# Patient Record
Sex: FEMALE | Race: WHITE | NOT HISPANIC OR LATINO | URBAN - METROPOLITAN AREA
[De-identification: names, ages, dates, MRNs, and addresses within clinical notes are randomized per-mention and may not be internally consistent; named-entity substitution may affect disease eponyms.]

---

## 2017-04-04 ENCOUNTER — FOLLOW UP ESTABLISHED (OUTPATIENT)
Dept: URBAN - METROPOLITAN AREA CLINIC 10 | Facility: CLINIC | Age: 52
End: 2017-04-04
Payer: COMMERCIAL

## 2017-04-04 DIAGNOSIS — M25.18 FISTULA, OTHER SPECIFIED SITE: ICD-10-CM

## 2017-04-04 PROCEDURE — 92083 EXTENDED VISUAL FIELD XM: CPT | Performed by: OPHTHALMOLOGY

## 2017-04-04 PROCEDURE — 92012 INTRM OPH EXAM EST PATIENT: CPT | Performed by: OPHTHALMOLOGY

## 2017-04-04 PROCEDURE — 92133 CPTRZD OPH DX IMG PST SGM ON: CPT | Performed by: OPHTHALMOLOGY

## 2017-04-04 ASSESSMENT — INTRAOCULAR PRESSURE
OD: 12
OS: 12

## 2017-11-17 ENCOUNTER — FOLLOW UP ESTABLISHED (OUTPATIENT)
Dept: URBAN - METROPOLITAN AREA CLINIC 10 | Facility: CLINIC | Age: 52
End: 2017-11-17
Payer: COMMERCIAL

## 2017-11-17 PROCEDURE — 92012 INTRM OPH EXAM EST PATIENT: CPT | Performed by: OPHTHALMOLOGY

## 2017-11-17 ASSESSMENT — INTRAOCULAR PRESSURE
OS: 13
OD: 13

## 2019-09-03 ENCOUNTER — FOLLOW UP ESTABLISHED (OUTPATIENT)
Dept: URBAN - METROPOLITAN AREA CLINIC 30 | Facility: CLINIC | Age: 54
End: 2019-09-03
Payer: COMMERCIAL

## 2019-09-03 DIAGNOSIS — H53.123 TRANSIENT VISUAL LOSS, BILATERAL: ICD-10-CM

## 2019-09-03 DIAGNOSIS — H40.013 OPEN ANGLE WITH BORDERLINE FINDINGS, LOW RISK, BILATERAL: Primary | ICD-10-CM

## 2019-09-03 DIAGNOSIS — H43.392 OTHER VITREOUS OPACITIES, LEFT EYE: ICD-10-CM

## 2019-09-03 PROCEDURE — 92134 CPTRZ OPH DX IMG PST SGM RTA: CPT | Performed by: OPTOMETRIST

## 2019-09-03 PROCEDURE — 92002 INTRM OPH EXAM NEW PATIENT: CPT | Performed by: OPTOMETRIST

## 2019-09-03 PROCEDURE — 92014 COMPRE OPH EXAM EST PT 1/>: CPT | Performed by: OPTOMETRIST

## 2019-09-03 ASSESSMENT — KERATOMETRY
OD: 40.16
OS: 40.96

## 2019-09-03 ASSESSMENT — INTRAOCULAR PRESSURE
OD: 14
OS: 14

## 2019-09-03 ASSESSMENT — VISUAL ACUITY
OS: 20/20
OD: 20/20

## 2023-01-25 ENCOUNTER — OFFICE VISIT (OUTPATIENT)
Dept: URBAN - METROPOLITAN AREA CLINIC 30 | Facility: CLINIC | Age: 58
End: 2023-01-25
Payer: COMMERCIAL

## 2023-01-25 DIAGNOSIS — H43.392 OTHER VITREOUS OPACITIES, LEFT EYE: ICD-10-CM

## 2023-01-25 DIAGNOSIS — H35.371 PUCKERING OF MACULA, RIGHT EYE: ICD-10-CM

## 2023-01-25 DIAGNOSIS — M25.18 FISTULA, OTHER SPECIFIED SITE: ICD-10-CM

## 2023-01-25 DIAGNOSIS — Z98.890 OTHER SPECIFIED POSTPROCEDURAL STATES: ICD-10-CM

## 2023-01-25 DIAGNOSIS — H02.421 MYOGENIC PTOSIS OF RT EYELID: ICD-10-CM

## 2023-01-25 DIAGNOSIS — H40.013 OPEN ANGLE WITH BORDERLINE FINDINGS, LOW RISK, BILATERAL: Primary | ICD-10-CM

## 2023-01-25 PROCEDURE — 92134 CPTRZ OPH DX IMG PST SGM RTA: CPT | Performed by: OPTOMETRIST

## 2023-01-25 PROCEDURE — 92004 COMPRE OPH EXAM NEW PT 1/>: CPT | Performed by: OPTOMETRIST

## 2023-01-25 PROCEDURE — 92133 CPTRZD OPH DX IMG PST SGM ON: CPT | Performed by: OPTOMETRIST

## 2023-01-25 ASSESSMENT — INTRAOCULAR PRESSURE
OD: 11
OS: 11

## 2023-01-25 ASSESSMENT — VISUAL ACUITY
OS: 20/20
OD: 20/20

## 2023-01-25 ASSESSMENT — KERATOMETRY
OS: 40.87
OD: 39.99

## 2023-01-25 NOTE — IMPRESSION/PLAN
Impression: Open angle with borderline findings, low risk, bilateral
PACHS 2013: [phone]) ++Family of Glaucoma ( mother and sister )/heart problem (aneurysm)/sleep apnea (no cpap)/ hx of migraines;Denies Sulfa Allergy/ Lung Problems +EDS Plan: IOP stable OU. Large disc OU. Rim tissue appears intact. Previously saw Dr. Jamal Lopez. Need updated diagnostics-noted 9/2019. Lost to follow up, per pt, she did not know she was supposed to return 1-2 weeks after last visit 9/2019 for IOP check and VF. Pt notes she has been having regular eye care c dilated exams at [facility]. Last saw Dr. Nadeen Singletary ~ 2 weeks ago. IOPs lower today, 11 OU. ONs appear stable. FDT 11/2017: OD) full. FDT OS) full. FDT 
RNFL 4/2017: OD) 75 (74), abnormal NFL, sup thinning. OCT RNFL OS) 74 (76), abnormal NFL, sup thinning. RNFL 1/2023: borderline NFL OD, abnormal NFL OS, (72,68), appears thinner OU. VF 4/2017:  OD) some rim depression. VF OS) full. Update VF next visit.

## 2023-01-25 NOTE — IMPRESSION/PLAN
Impression: Fistula, other specified site Carotid Cavernous Fistula Plan: Per Dr. Aleksandra Olson 9/2019: Currently under care of Neurology Dr. Betty Marx. 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 See previous notes from 2017. 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 Recommend f/u with Neuroophthalmology due to multiple comorbidities and complex neuroophthalmic symptoms. Will refer to Dr. Charley Remy or [facility]. Hx of left ophthalmic artery aneurysm. Pt will continue care c neurology.

## 2023-01-25 NOTE — IMPRESSION/PLAN
Impression: Puckering of macula, right eye: H35.371. Plan: Minimal on exam/MAC OCT. Not impacting vision. Tx not indicated. Pt is to contact office c any decrease/distortion of vision.

## 2023-01-25 NOTE — IMPRESSION/PLAN
Impression: Other vitreous opacities, left eye: H44.061. Plan: Pt educ on findings. Retinas flat and attached OU. Reviewed signs and symptoms of RD and to call asap if occurs. +Fam Hx of AMD. Rec antioxidant-rich diet. Can start [facility].

## 2023-01-25 NOTE — IMPRESSION/PLAN
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 Plan: Small RUL ptosis. Seems to be more neurologic in nature due to history of seizure activity. Pt to continue care c neurology.

## 2023-09-25 ENCOUNTER — OFFICE VISIT (OUTPATIENT)
Dept: URBAN - METROPOLITAN AREA CLINIC 30 | Facility: CLINIC | Age: 58
End: 2023-09-25
Payer: MEDICARE

## 2023-09-25 DIAGNOSIS — H40.013 OPEN ANGLE WITH BORDERLINE FINDINGS, LOW RISK, BILATERAL: Primary | ICD-10-CM

## 2023-09-25 PROCEDURE — 76514 ECHO EXAM OF EYE THICKNESS: CPT | Performed by: OPTOMETRIST

## 2023-09-25 PROCEDURE — 92083 EXTENDED VISUAL FIELD XM: CPT | Performed by: OPTOMETRIST

## 2023-09-25 PROCEDURE — 99213 OFFICE O/P EST LOW 20 MIN: CPT | Performed by: OPTOMETRIST

## 2023-09-25 ASSESSMENT — INTRAOCULAR PRESSURE
OS: 14
OD: 15

## 2024-03-07 ENCOUNTER — OFFICE VISIT (OUTPATIENT)
Dept: URBAN - METROPOLITAN AREA CLINIC 30 | Facility: CLINIC | Age: 59
End: 2024-03-07
Payer: MEDICARE

## 2024-03-07 DIAGNOSIS — H25.13 AGE-RELATED NUCLEAR CATARACT, BILATERAL: ICD-10-CM

## 2024-03-07 DIAGNOSIS — M25.18 FISTULA, OTHER SPECIFIED SITE: ICD-10-CM

## 2024-03-07 DIAGNOSIS — H40.013 OPEN ANGLE WITH BORDERLINE FINDINGS, LOW RISK, BILATERAL: Primary | ICD-10-CM

## 2024-03-07 DIAGNOSIS — Z98.890 OTHER SPECIFIED POSTPROCEDURAL STATES: ICD-10-CM

## 2024-03-07 DIAGNOSIS — G43.101 MIGRAINE WITH AURA, NOT INTRACTABLE, WITH STATUS MIGRAINOSUS: ICD-10-CM

## 2024-03-07 DIAGNOSIS — H43.392 OTHER VITREOUS OPACITIES, LEFT EYE: ICD-10-CM

## 2024-03-07 PROCEDURE — 92014 COMPRE OPH EXAM EST PT 1/>: CPT | Performed by: OPTOMETRIST

## 2024-03-07 PROCEDURE — 92133 CPTRZD OPH DX IMG PST SGM ON: CPT | Performed by: OPTOMETRIST

## 2024-03-07 ASSESSMENT — KERATOMETRY
OD: 40.42
OS: 41.09

## 2024-03-07 ASSESSMENT — INTRAOCULAR PRESSURE
OS: 13
OD: 13

## 2024-03-07 ASSESSMENT — VISUAL ACUITY
OD: 20/20
OS: 20/20